# Patient Record
Sex: FEMALE | Race: WHITE | NOT HISPANIC OR LATINO | Employment: FULL TIME | URBAN - NONMETROPOLITAN AREA
[De-identification: names, ages, dates, MRNs, and addresses within clinical notes are randomized per-mention and may not be internally consistent; named-entity substitution may affect disease eponyms.]

---

## 2023-02-23 ENCOUNTER — TRANSCRIBE ORDERS (OUTPATIENT)
Dept: ADMINISTRATIVE | Facility: HOSPITAL | Age: 53
End: 2023-02-23
Payer: COMMERCIAL

## 2023-02-23 DIAGNOSIS — M25.562 LEFT KNEE PAIN, UNSPECIFIED CHRONICITY: Primary | ICD-10-CM

## 2023-02-23 DIAGNOSIS — Z85.05 PERSONAL HISTORY OF MALIGNANT NEOPLASM OF LIVER: Primary | ICD-10-CM

## 2023-03-17 ENCOUNTER — HOSPITAL ENCOUNTER (OUTPATIENT)
Dept: MRI IMAGING | Facility: HOSPITAL | Age: 53
Discharge: HOME | End: 2023-03-17
Admitting: PHYSICIAN ASSISTANT
Payer: COMMERCIAL

## 2023-03-17 DIAGNOSIS — M25.562 LEFT KNEE PAIN, UNSPECIFIED CHRONICITY: ICD-10-CM

## 2023-03-17 PROCEDURE — 73721 MRI JNT OF LWR EXTRE W/O DYE: CPT

## 2023-03-17 PROCEDURE — 73721 MRI JNT OF LWR EXTRE W/O DYE: CPT | Performed by: RADIOLOGY

## 2023-03-21 ENCOUNTER — HOSPITAL ENCOUNTER (OUTPATIENT)
Dept: CT IMAGING | Facility: HOSPITAL | Age: 53
Discharge: HOME | End: 2023-03-21
Admitting: PHYSICIAN ASSISTANT
Payer: COMMERCIAL

## 2023-03-21 DIAGNOSIS — Z85.05 PERSONAL HISTORY OF MALIGNANT NEOPLASM OF LIVER: ICD-10-CM

## 2023-03-21 LAB — CREAT BLDA-MCNC: 0.8 MG/DL (ref 0.6–1.3)

## 2023-03-21 PROCEDURE — 25510000001 IOPAMIDOL 61 % SOLUTION: Performed by: PHYSICIAN ASSISTANT

## 2023-03-21 PROCEDURE — 74177 CT ABD & PELVIS W/CONTRAST: CPT | Performed by: RADIOLOGY

## 2023-03-21 PROCEDURE — 82565 ASSAY OF CREATININE: CPT

## 2023-03-21 PROCEDURE — 74177 CT ABD & PELVIS W/CONTRAST: CPT

## 2023-03-21 RX ADMIN — IOPAMIDOL 88 ML: 612 INJECTION, SOLUTION INTRAVENOUS at 08:16

## 2023-12-27 ENCOUNTER — TRANSCRIBE ORDERS (OUTPATIENT)
Dept: ADMINISTRATIVE | Facility: HOSPITAL | Age: 53
End: 2023-12-27
Payer: COMMERCIAL

## 2023-12-27 DIAGNOSIS — R19.09 OTHER INTRA-ABDOMINAL AND PELVIC SWELLING, MASS AND LUMP: Primary | ICD-10-CM

## 2024-01-08 ENCOUNTER — HOSPITAL ENCOUNTER (OUTPATIENT)
Dept: CT IMAGING | Facility: HOSPITAL | Age: 54
Discharge: HOME | End: 2024-01-08
Admitting: SURGERY
Payer: COMMERCIAL

## 2024-01-08 DIAGNOSIS — R19.09 OTHER INTRA-ABDOMINAL AND PELVIC SWELLING, MASS AND LUMP: ICD-10-CM

## 2024-01-08 LAB — CREAT BLDA-MCNC: 1.1 MG/DL (ref 0.6–1.3)

## 2024-01-08 PROCEDURE — 74177 CT ABD & PELVIS W/CONTRAST: CPT

## 2024-01-08 PROCEDURE — 25510000001 IOPAMIDOL 61 % SOLUTION: Performed by: SURGERY

## 2024-01-08 PROCEDURE — 82565 ASSAY OF CREATININE: CPT

## 2024-01-08 PROCEDURE — 74177 CT ABD & PELVIS W/CONTRAST: CPT | Performed by: RADIOLOGY

## 2024-01-08 RX ADMIN — IOPAMIDOL 90 ML: 612 INJECTION, SOLUTION INTRAVENOUS at 09:42

## 2024-09-18 ENCOUNTER — OFFICE VISIT (OUTPATIENT)
Dept: ENDOCRINOLOGY | Facility: CLINIC | Age: 54
End: 2024-09-18
Payer: COMMERCIAL

## 2024-09-18 VITALS
OXYGEN SATURATION: 99 % | WEIGHT: 198.2 LBS | HEART RATE: 69 BPM | DIASTOLIC BLOOD PRESSURE: 89 MMHG | SYSTOLIC BLOOD PRESSURE: 137 MMHG

## 2024-09-18 DIAGNOSIS — E06.3 HASHIMOTO'S DISEASE: Primary | ICD-10-CM

## 2024-09-18 DIAGNOSIS — E27.8 ADRENAL MASS: ICD-10-CM

## 2024-09-18 PROCEDURE — 84443 ASSAY THYROID STIM HORMONE: CPT | Performed by: NURSE PRACTITIONER

## 2024-09-18 PROCEDURE — 84439 ASSAY OF FREE THYROXINE: CPT | Performed by: NURSE PRACTITIONER

## 2024-09-18 PROCEDURE — 82533 TOTAL CORTISOL: CPT | Performed by: NURSE PRACTITIONER

## 2024-09-18 PROCEDURE — 99203 OFFICE O/P NEW LOW 30 MIN: CPT | Performed by: NURSE PRACTITIONER

## 2024-09-18 PROCEDURE — 84481 FREE ASSAY (FT-3): CPT | Performed by: NURSE PRACTITIONER

## 2024-09-18 RX ORDER — KRILL/OM-3/DHA/EPA/PHOSPHO/AST 500MG-86MG
500 CAPSULE ORAL DAILY
COMMUNITY

## 2024-09-18 RX ORDER — AMPICILLIN TRIHYDRATE 250 MG
600 CAPSULE ORAL 2 TIMES DAILY
COMMUNITY

## 2024-09-18 RX ORDER — CHOLECALCIFEROL (VITAMIN D3) 25 MCG
1000 TABLET ORAL DAILY
COMMUNITY

## 2024-09-18 RX ORDER — SEMAGLUTIDE 2.68 MG/ML
2 INJECTION, SOLUTION SUBCUTANEOUS WEEKLY
COMMUNITY

## 2024-09-18 RX ORDER — THYROID 90 MG/1
180 TABLET ORAL DAILY
COMMUNITY
End: 2024-09-19 | Stop reason: SDUPTHER

## 2024-09-18 RX ORDER — IBUPROFEN 800 MG/1
800 TABLET, FILM COATED ORAL EVERY 6 HOURS PRN
COMMUNITY
Start: 2024-04-01

## 2024-09-19 PROBLEM — E27.8 ADRENAL MASS: Status: ACTIVE | Noted: 2024-09-19

## 2024-09-19 PROBLEM — E06.3 HASHIMOTO'S DISEASE: Status: ACTIVE | Noted: 2024-09-19

## 2024-09-19 LAB
CORTIS SERPL-MCNC: 5.35 MCG/DL
T3FREE SERPL-MCNC: 5.77 PG/ML (ref 2–4.4)
T4 FREE SERPL-MCNC: 0.76 NG/DL (ref 0.92–1.68)
TSH SERPL DL<=0.05 MIU/L-ACNC: 13.8 UIU/ML (ref 0.27–4.2)

## 2024-09-19 RX ORDER — LIOTHYRONINE SODIUM 5 UG/1
5 TABLET ORAL DAILY
Qty: 30 TABLET | Refills: 11 | Status: SHIPPED | OUTPATIENT
Start: 2024-09-19 | End: 2025-09-19

## 2024-09-19 RX ORDER — THYROID 120 MG/1
240 TABLET ORAL DAILY
Qty: 180 TABLET | Refills: 1 | Status: SHIPPED | OUTPATIENT
Start: 2024-09-19

## 2024-10-22 ENCOUNTER — HOSPITAL ENCOUNTER (OUTPATIENT)
Dept: ULTRASOUND IMAGING | Facility: HOSPITAL | Age: 54
Discharge: HOME | End: 2024-10-22
Admitting: NURSE PRACTITIONER
Payer: COMMERCIAL

## 2024-10-22 ENCOUNTER — TRANSCRIBE ORDERS (OUTPATIENT)
Dept: ADMINISTRATIVE | Facility: HOSPITAL | Age: 54
End: 2024-10-22
Payer: COMMERCIAL

## 2024-10-22 DIAGNOSIS — R91.8 LUNG NODULES: Primary | ICD-10-CM

## 2024-10-22 DIAGNOSIS — Z12.31 VISIT FOR SCREENING MAMMOGRAM: ICD-10-CM

## 2024-10-22 DIAGNOSIS — Z85.05 PERSONAL HISTORY OF MALIGNANT NEOPLASM OF LIVER: ICD-10-CM

## 2024-10-22 PROCEDURE — 76536 US EXAM OF HEAD AND NECK: CPT | Performed by: RADIOLOGY

## 2024-10-22 PROCEDURE — 76536 US EXAM OF HEAD AND NECK: CPT

## 2024-10-25 ENCOUNTER — HOSPITAL ENCOUNTER (OUTPATIENT)
Dept: CT IMAGING | Facility: HOSPITAL | Age: 54
Discharge: HOME | End: 2024-10-25
Payer: COMMERCIAL

## 2024-10-25 DIAGNOSIS — R91.8 LUNG NODULES: ICD-10-CM

## 2024-10-25 DIAGNOSIS — Z85.05 PERSONAL HISTORY OF MALIGNANT NEOPLASM OF LIVER: ICD-10-CM

## 2024-10-25 PROCEDURE — 25510000001 IOPAMIDOL 61 % SOLUTION: Performed by: PHYSICIAN ASSISTANT

## 2024-10-25 PROCEDURE — 74178 CT ABD&PLV WO CNTR FLWD CNTR: CPT

## 2024-10-25 PROCEDURE — 71250 CT THORAX DX C-: CPT

## 2024-10-25 RX ORDER — IOPAMIDOL 612 MG/ML
100 INJECTION, SOLUTION INTRAVASCULAR
Status: COMPLETED | OUTPATIENT
Start: 2024-10-25 | End: 2024-10-25

## 2024-10-25 RX ADMIN — IOPAMIDOL 85 ML: 612 INJECTION, SOLUTION INTRAVENOUS at 07:43

## 2025-01-14 ENCOUNTER — HOSPITAL ENCOUNTER (OUTPATIENT)
Facility: HOSPITAL | Age: 55
Discharge: HOME | End: 2025-01-14
Admitting: NURSE PRACTITIONER
Payer: COMMERCIAL

## 2025-01-14 ENCOUNTER — TRANSCRIBE ORDERS (OUTPATIENT)
Dept: ADMINISTRATIVE | Facility: HOSPITAL | Age: 55
End: 2025-01-14
Payer: COMMERCIAL

## 2025-01-14 DIAGNOSIS — R07.81 PLEURITIC CHEST PAIN: Primary | ICD-10-CM

## 2025-01-14 DIAGNOSIS — R07.81 PLEURITIC CHEST PAIN: ICD-10-CM

## 2025-01-14 PROCEDURE — 71046 X-RAY EXAM CHEST 2 VIEWS: CPT | Performed by: RADIOLOGY

## 2025-01-14 PROCEDURE — 71046 X-RAY EXAM CHEST 2 VIEWS: CPT

## 2025-05-16 ENCOUNTER — HOSPITAL ENCOUNTER (OUTPATIENT)
Facility: HOSPITAL | Age: 55
Discharge: HOME | End: 2025-05-16
Admitting: PHYSICIAN ASSISTANT
Payer: COMMERCIAL

## 2025-05-16 DIAGNOSIS — Z12.31 VISIT FOR SCREENING MAMMOGRAM: ICD-10-CM

## 2025-05-16 PROCEDURE — 77063 BREAST TOMOSYNTHESIS BI: CPT

## 2025-05-16 PROCEDURE — 77067 SCR MAMMO BI INCL CAD: CPT

## 2025-06-13 ENCOUNTER — HOSPITAL ENCOUNTER (EMERGENCY)
Facility: HOSPITAL | Age: 55
Discharge: HOME | End: 2025-06-14
Payer: COMMERCIAL

## 2025-06-13 ENCOUNTER — APPOINTMENT (OUTPATIENT)
Dept: GENERAL RADIOLOGY | Facility: HOSPITAL | Age: 55
End: 2025-06-13
Payer: COMMERCIAL

## 2025-06-13 VITALS
TEMPERATURE: 98.1 F | RESPIRATION RATE: 20 BRPM | DIASTOLIC BLOOD PRESSURE: 90 MMHG | BODY MASS INDEX: 30.86 KG/M2 | OXYGEN SATURATION: 100 % | HEIGHT: 66 IN | WEIGHT: 192 LBS | HEART RATE: 69 BPM | SYSTOLIC BLOOD PRESSURE: 166 MMHG

## 2025-06-13 DIAGNOSIS — S52.602A CLOSED FRACTURE OF DISTAL END OF LEFT ULNA, UNSPECIFIED FRACTURE MORPHOLOGY, INITIAL ENCOUNTER: ICD-10-CM

## 2025-06-13 DIAGNOSIS — S52.122A CLOSED DISPLACED FRACTURE OF HEAD OF LEFT RADIUS, INITIAL ENCOUNTER: Primary | ICD-10-CM

## 2025-06-13 PROCEDURE — 73110 X-RAY EXAM OF WRIST: CPT

## 2025-06-13 PROCEDURE — 96374 THER/PROPH/DIAG INJ IV PUSH: CPT

## 2025-06-13 PROCEDURE — 25010000002 HYDROMORPHONE PER 4 MG

## 2025-06-13 PROCEDURE — 96375 TX/PRO/DX INJ NEW DRUG ADDON: CPT

## 2025-06-13 PROCEDURE — 99285 EMERGENCY DEPT VISIT HI MDM: CPT

## 2025-06-13 PROCEDURE — 25810000003 SODIUM CHLORIDE 0.9 % SOLUTION

## 2025-06-13 PROCEDURE — 25010000002 ONDANSETRON PER 1 MG

## 2025-06-13 PROCEDURE — 73030 X-RAY EXAM OF SHOULDER: CPT

## 2025-06-13 RX ORDER — ONDANSETRON 2 MG/ML
4 INJECTION INTRAMUSCULAR; INTRAVENOUS ONCE
Status: COMPLETED | OUTPATIENT
Start: 2025-06-13 | End: 2025-06-13

## 2025-06-13 RX ORDER — SODIUM CHLORIDE 0.9 % (FLUSH) 0.9 %
10 SYRINGE (ML) INJECTION AS NEEDED
Status: DISCONTINUED | OUTPATIENT
Start: 2025-06-13 | End: 2025-06-14 | Stop reason: HOSPADM

## 2025-06-13 RX ORDER — HYDROMORPHONE HYDROCHLORIDE 1 MG/ML
0.5 INJECTION, SOLUTION INTRAMUSCULAR; INTRAVENOUS; SUBCUTANEOUS ONCE
Refills: 0 | Status: COMPLETED | OUTPATIENT
Start: 2025-06-13 | End: 2025-06-13

## 2025-06-13 RX ORDER — HYDROMORPHONE HYDROCHLORIDE 1 MG/ML
0.5 INJECTION, SOLUTION INTRAMUSCULAR; INTRAVENOUS; SUBCUTANEOUS ONCE
Refills: 0 | Status: COMPLETED | OUTPATIENT
Start: 2025-06-14 | End: 2025-06-14

## 2025-06-13 RX ADMIN — HYDROMORPHONE HYDROCHLORIDE 0.5 MG: 1 INJECTION, SOLUTION INTRAMUSCULAR; INTRAVENOUS; SUBCUTANEOUS at 21:43

## 2025-06-13 RX ADMIN — Medication 44 MG: at 22:00

## 2025-06-13 RX ADMIN — ONDANSETRON 4 MG: 2 INJECTION INTRAMUSCULAR; INTRAVENOUS at 21:42

## 2025-06-13 RX ADMIN — SODIUM CHLORIDE 1000 ML: 9 INJECTION, SOLUTION INTRAVENOUS at 21:52

## 2025-06-14 PROCEDURE — 73110 X-RAY EXAM OF WRIST: CPT | Performed by: RADIOLOGY

## 2025-06-14 PROCEDURE — 73030 X-RAY EXAM OF SHOULDER: CPT | Performed by: RADIOLOGY

## 2025-06-14 PROCEDURE — 96376 TX/PRO/DX INJ SAME DRUG ADON: CPT

## 2025-06-14 PROCEDURE — 25010000002 HYDROMORPHONE PER 4 MG

## 2025-06-14 RX ORDER — OXYCODONE AND ACETAMINOPHEN 5; 325 MG/1; MG/1
1 TABLET ORAL EVERY 6 HOURS PRN
Refills: 0 | Status: DISCONTINUED | OUTPATIENT
Start: 2025-06-14 | End: 2025-06-14 | Stop reason: HOSPADM

## 2025-06-14 RX ORDER — OXYCODONE AND ACETAMINOPHEN 7.5; 325 MG/1; MG/1
1 TABLET ORAL EVERY 6 HOURS PRN
Qty: 12 TABLET | Refills: 0 | Status: SHIPPED | OUTPATIENT
Start: 2025-06-14 | End: 2025-06-20

## 2025-06-14 RX ADMIN — HYDROMORPHONE HYDROCHLORIDE 0.5 MG: 1 INJECTION, SOLUTION INTRAMUSCULAR; INTRAVENOUS; SUBCUTANEOUS at 00:07

## 2025-06-14 NOTE — ED PROVIDER NOTES
Subjective   History of Present Illness  Patient is a 54-year-old female who presents today after a fall at her home.  She reports that after it began raining she went outside where she slipped in the mud and caught herself with her left arm.  She denies hitting her head or loss of consciousness.  She reports that the main area of her pain is to her left wrist however the pain goes all the way up to her left shoulder.  Pulses are noted distally however radial pulse is noted to be slightly decreased.  She is able to move all of her fingers without difficulty capillary refill is brisk.  There is swelling, bruising, and pain on palpation during assessment.  She denies any other complaints this date.  She presents private vehicle with her wife at bedside.        Review of Systems   HENT: Negative.     Respiratory: Negative.     Cardiovascular: Negative.    Gastrointestinal: Negative.    Musculoskeletal:         L arm pain   Skin:         bruising   Neurological: Negative.        No past medical history on file.    Allergies   Allergen Reactions    Soybean-Containing Drug Products Seizure    Morphine Other (See Comments)     SWEATING, PAIN, NAUSEA AND DIZZY       Past Surgical History:   Procedure Laterality Date    HYSTERECTOMY  2004       Family History   Problem Relation Age of Onset    Breast cancer Neg Hx        Social History     Socioeconomic History    Marital status:    Tobacco Use    Smoking status: Never    Smokeless tobacco: Never   Substance and Sexual Activity    Alcohol use: Not Currently     Comment: 4x yearly    Drug use: Never    Sexual activity: Defer           Objective   Physical Exam  Constitutional:       Appearance: Normal appearance.   HENT:      Head: Normocephalic and atraumatic.      Nose: Nose normal.   Cardiovascular:      Rate and Rhythm: Normal rate.      Heart sounds: Normal heart sounds.   Pulmonary:      Effort: Pulmonary effort is normal.      Breath sounds: Normal breath  sounds.   Abdominal:      General: Bowel sounds are normal.   Musculoskeletal:         General: Swelling, tenderness, deformity and signs of injury present.      Cervical back: Normal range of motion.   Skin:     Capillary Refill: Capillary refill takes 2 to 3 seconds.      Findings: Bruising present.   Neurological:      Mental Status: She is alert and oriented to person, place, and time.         FX Dislocation    Date/Time: 6/13/2025 10:00 PM    Performed by: Gopal Buckley DO  Authorized by: Gopal Buckley DO    Consent:     Consent obtained:  Written and verbal    Consent given by:  Patient    Risks, benefits, and alternatives were discussed: yes      Risks discussed:  Nerve damage and pain    Alternatives discussed:  No treatment, delayed treatment and observation  Universal protocol:     Procedure explained and questions answered to patient or proxy's satisfaction: yes      Relevant documents present and verified: yes      Imaging studies available: yes      Site/side marked: yes      Immediately prior to procedure, a time out was called: yes      Patient identity confirmed:  Verbally with patient and arm band  Injury:     Injury location:  Forearm    Forearm injury location:  L forearm    Forearm fracture type: radial head    Pre-procedure details:     Distal neurologic exam:  Normal    Distal perfusion: distal pulses diminished      Range of motion: reduced    Sedation:     Sedation type:  Moderate sedation  Anesthesia:     Anesthesia method:  None  Procedure details:     Manipulation performed: yes      Reduction successful: yes      X-ray confirmed reduction: yes      Immobilization:  Splint and sling    Splint type:  Sugar tong    Supplies used:  Cotton padding, sling and fiberglass    Attestation: Splint applied and adjusted personally by me    Post-procedure details:     Distal neurologic exam:  Normal    Distal perfusion: distal pulses strong and brisk capillary refill      Range of motion:  improved      Procedure completion:  Tolerated  Procedural Sedation    Date/Time: 6/13/2025 10:00 PM    Performed by: Gopal Buckley DO  Authorized by: Gopal Buckley DO    Consent:     Consent obtained:  Verbal    Consent given by:  Patient    Risks, benefits, and alternatives were discussed: yes      Risks discussed:  Allergic reaction, prolonged hypoxia resulting in organ damage, dysrhythmia, prolonged sedation necessitating reversal and inadequate sedation    Alternatives discussed:  Analgesia without sedation  Universal protocol:     Procedure explained and questions answered to patient or proxy's satisfaction: yes      Relevant documents present and verified: yes      Test results available: yes      Imaging studies available: yes      Site/side marked: yes      Immediately prior to procedure, a time out was called: yes      Patient identity confirmed:  Verbally with patient and arm band  Indications:     Procedure performed:  Fracture reduction    Procedure necessitating sedation performed by:  Physician performing sedation    Intended level of sedation:  Moderate  Pre-sedation assessment:     Time since last food or drink:  1800    ASA classification: class 1 - normal, healthy patient      Mouth opening:  3 or more finger widths    Thyromental distance:  4 finger widths    Mallampati score:  I - soft palate, uvula, fauces, pillars visible    Neck mobility: normal      Pre-sedation assessments completed and reviewed: airway patency, anesthesia/sedation history, cardiovascular function, hydration status, mental status, nausea/vomiting and pain level      History of difficult intubation: no      Pre-sedation assessment completed:  6/13/2025 9:57 PM  Immediate pre-procedure details:     Reassessment: Patient reassessed immediately prior to procedure      Reviewed: vital signs and relevant labs/tests      Verified: bag valve mask available, emergency equipment available, intubation equipment available,  IV patency confirmed, oxygen available and suction available    Procedure details (see MAR for exact dosages):     Sedation start time:  6/13/2025 10:00 PM    Preoxygenation:  Nasal cannula    Sedation:  Ketamine    Analgesia:  Hydromorphone    Intra-procedure monitoring:  Blood pressure monitoring, frequent LOC assessments, cardiac monitor, continuous pulse oximetry and frequent vital sign checks    Intra-procedure events: none      Sedation end time:  6/13/2025 10:06 PM    Total sedation time (minutes):  9  Post-procedure details:     Post-sedation assessment completed:  6/13/2025 10:09 PM    Attendance: Constant attendance by certified staff until patient recovered      Recovery: Patient returned to pre-procedure baseline      Post-sedation assessments completed and reviewed: airway patency, cardiovascular function, mental status, nausea/vomiting, pain level and respiratory function      Specimens recovered:  None    Patient is stable for discharge or admission: yes      Procedure completion:  Tolerated             ED Course  ED Course as of 06/14/25 0552   Fri Jun 13, 2025   2224 Imaging reviewed per Dr. Buckley, reports the reduction is successful. Patient reports she does feel some better. Splint in place post reduction, capillary refill brisk, distal pulses strong [KH]      ED Course User Index  [KH] Micki Gabriel, APRN                                                       Medical Decision Making  Patient is a 54-year-old female who presents today after a fall at her home.  She reports that after it began raining she went outside where she slipped in the mud and caught herself with her left arm.  She denies hitting her head or loss of consciousness.  She reports that the main area of her pain is to her left wrist however the pain goes all the way up to her left shoulder.  Pulses are noted distally however radial pulse is noted to be slightly decreased.  She is able to move all of her fingers without  difficulty capillary refill is brisk.  There is swelling, bruising, and pain on palpation during assessment.  She denies any other complaints this date.  She presents private vehicle with her wife at bedside.    Problems Addressed:  Closed displaced fracture of head of left radius, initial encounter: complicated acute illness or injury  Closed fracture of distal end of left ulna, unspecified fracture morphology, initial encounter: complicated acute illness or injury    Amount and/or Complexity of Data Reviewed  Radiology: ordered.    Risk  Prescription drug management.        Final diagnoses:   Closed displaced fracture of head of left radius, initial encounter   Closed fracture of distal end of left ulna, unspecified fracture morphology, initial encounter       ED Disposition  ED Disposition       ED Disposition   Discharge    Condition   Stable    Comment   --               Beau Oliveira  [address] [place] [address] 25   [address]  [place] [id number] [phone]    Schedule an appointment as soon as possible for a visit   As needed    Luan Gusman, DO  [address] [id number] [phone]    Schedule an appointment as soon as possible for a visit on 6/16/2025           Medication List        New Prescriptions      oxyCODONE-acetaminophen 7.5-325 MG per tablet  Commonly known as: PERCOCET  Take 1 tablet by mouth Every 6 (Six) Hours As Needed for Moderate Pain.               Where to Get Your Medications        These medications were sent to [facility] [address] [place] [address] - [address] [place] [address] - [phone]  - [phone]   [address] [address]      Phone: [phone]   oxyCODONE-acetaminophen 7.5-325 MG per tablet            Micki Gabriel, APRN  06/14/25 0552

## 2025-06-20 ENCOUNTER — PRE-ADMISSION TESTING (OUTPATIENT)
Dept: PREADMISSION TESTING | Facility: HOSPITAL | Age: 55
End: 2025-06-20
Payer: COMMERCIAL

## 2025-06-20 LAB
ANION GAP SERPL CALCULATED.3IONS-SCNC: 12 MMOL/L (ref 5–15)
BUN SERPL-MCNC: 11.3 MG/DL (ref 6–20)
BUN/CREAT SERPL: 15.3 (ref 7–25)
CALCIUM SPEC-SCNC: 9.6 MG/DL (ref 8.6–10.5)
CHLORIDE SERPL-SCNC: 101 MMOL/L (ref 98–107)
CO2 SERPL-SCNC: 29 MMOL/L (ref 22–29)
CREAT SERPL-MCNC: 0.74 MG/DL (ref 0.57–1)
DEPRECATED RDW RBC AUTO: 42.7 FL (ref 37–54)
EGFRCR SERPLBLD CKD-EPI 2021: 95.1 ML/MIN/1.73
ERYTHROCYTE [DISTWIDTH] IN BLOOD BY AUTOMATED COUNT: 14.1 % (ref 12.3–15.4)
GLUCOSE SERPL-MCNC: 89 MG/DL (ref 65–99)
HCT VFR BLD AUTO: 41.4 % (ref 34–46.6)
HGB BLD-MCNC: 13.3 G/DL (ref 12–15.9)
MCH RBC QN AUTO: 27 PG (ref 26.6–33)
MCHC RBC AUTO-ENTMCNC: 32.1 G/DL (ref 31.5–35.7)
MCV RBC AUTO: 84 FL (ref 79–97)
PLATELET # BLD AUTO: 185 10*3/MM3 (ref 140–450)
PMV BLD AUTO: 11.3 FL (ref 6–12)
POTASSIUM SERPL-SCNC: 3.8 MMOL/L (ref 3.5–5.2)
RBC # BLD AUTO: 4.93 10*6/MM3 (ref 3.77–5.28)
SODIUM SERPL-SCNC: 142 MMOL/L (ref 136–145)
WBC NRBC COR # BLD AUTO: 4.69 10*3/MM3 (ref 3.4–10.8)

## 2025-06-20 PROCEDURE — 36415 COLL VENOUS BLD VENIPUNCTURE: CPT

## 2025-06-20 PROCEDURE — 80048 BASIC METABOLIC PNL TOTAL CA: CPT

## 2025-06-20 PROCEDURE — 85027 COMPLETE CBC AUTOMATED: CPT

## 2025-06-20 RX ORDER — TRAMADOL HYDROCHLORIDE 50 MG/1
TABLET ORAL
COMMUNITY
Start: 2025-06-18

## 2025-06-20 RX ORDER — GABAPENTIN 100 MG/1
CAPSULE ORAL
COMMUNITY
Start: 2025-06-18

## 2025-06-20 RX ORDER — MELOXICAM 15 MG/1
TABLET ORAL
COMMUNITY
Start: 2025-03-31

## 2025-06-20 NOTE — DISCHARGE INSTRUCTIONS
Please discontinue all blood thinners and anticoagulants (except aspirin) prior to surgery as per your surgeon and cardiologist instructions.  Aspirin may be continued up to the day prior to surgery.    HOLD all diabetic medications the morning of surgery as order by physician.    Please follow instructions on use of prep cloths provided by nurse. Return instruction sheet to pre-op nurse on day of surgery.    Arrival time for surgery on  6/23/25 will be given to you by Dr. Perera's  Office.    A RESPONSIBLE PERSON MUST REMAIN IN THE WAITING ROOM DURING YOUR PROCEDURE AND A RESPONSIBLE  MUST BE AVAILABLE UPON YOUR DISCHARGE.    General Instructions:  Do NOT eat or drink after midnight 6/22/25 which includes water, mints, or gum.  You may brush your teeth. Dental appliances that are removable must be taken out day of surgery.  Do NOT smoke, chew tobacco, or drink alcohol within 24 hours prior to surgery.  Bring medications in original bottles, any inhalers and if applicable your C-PAP/BI-PAP machine  Bring any papers given to you in the doctor’s office  Wear clean, comfortable clothes and socks  Do NOT wear contact lenses or make-up or dark nail polish.  Bring a case for your glasses if applicable.  Bring crutches or walker if applicable  Leave all other valuables and jewelry at home  If you were given a blood bank armband, continue to wear it until discharged.    Preventing a Surgical Site Infection:  Shower the night before surgery (unless instructed otherwise) using a fresh bar of anti-bacterial soap (such as Dial) and clean washcloth.  Dry with a clean towel and dress in clean clothing.  For 2 to 3 days before surgery, avoid shaving with a razor near where you will have surgery because the razor can irritate skin and make it easier to develop an infection.  Ask your surgeon if you will be receiving antibiotics prior to surgery.  Make sure you, your family, and all healthcare providers clean their hands with  soap and water or an alcohol-based hand  before caring for you or your wound.  If at all possible, quit smoking as many days before surgery as you can.    Day of Surgery:  Upon arrival, a pre-op nurse and anesthesiologist will review your health history, obtain vital signs, and answer questions you may have.  The only belongings needed at this time will be your home medications and if applicable you C-PAP/BI-PAP machine.  If you are staying overnight, your family can leave the rest of your belongings in the car and bring them to your room later.  A pre-op nurse will start an IV and you may receive medication in preparation for surgery.  Due to patient privacy and limited space, only one member of your family will be able to accompany you in the pre-op area.  While you are in surgery your family should notify the waiting room  if they leave the waiting room area and provide a contact number.  Please be aware that surgery does come with discomfort.  We want to make every effort to control your discomfort so please discuss any uncontrolled symptoms with your nurse.  Your doctor will most likely have prescribed pain medications.  If you are going home after surgery you will receive individualized written care instructions before being discharged.  A responsible adult must drive you to and from the hospital on the day of surgery and stay with you for 24 hours.  If you are staying overnight following surgery, you will be transported to your hospital room following the recovery period.

## 2025-06-23 ENCOUNTER — ANESTHESIA EVENT (OUTPATIENT)
Dept: PERIOP | Facility: HOSPITAL | Age: 55
End: 2025-06-23
Payer: COMMERCIAL

## 2025-06-23 ENCOUNTER — HOSPITAL ENCOUNTER (OUTPATIENT)
Facility: HOSPITAL | Age: 55
Setting detail: HOSPITAL OUTPATIENT SURGERY
Discharge: HOME | End: 2025-06-23
Attending: GENERAL PRACTICE | Admitting: GENERAL PRACTICE
Payer: COMMERCIAL

## 2025-06-23 ENCOUNTER — ANESTHESIA (OUTPATIENT)
Dept: PERIOP | Facility: HOSPITAL | Age: 55
End: 2025-06-23
Payer: COMMERCIAL

## 2025-06-23 ENCOUNTER — APPOINTMENT (OUTPATIENT)
Dept: GENERAL RADIOLOGY | Facility: HOSPITAL | Age: 55
End: 2025-06-23
Payer: COMMERCIAL

## 2025-06-23 VITALS
HEART RATE: 58 BPM | SYSTOLIC BLOOD PRESSURE: 142 MMHG | OXYGEN SATURATION: 99 % | BODY MASS INDEX: 37.97 KG/M2 | DIASTOLIC BLOOD PRESSURE: 78 MMHG | RESPIRATION RATE: 18 BRPM | HEIGHT: 60 IN | TEMPERATURE: 97.4 F | WEIGHT: 193.4 LBS

## 2025-06-23 DIAGNOSIS — Z98.890 H/O WRIST SURGERY: Primary | ICD-10-CM

## 2025-06-23 LAB — GLUCOSE BLDC GLUCOMTR-MCNC: 80 MG/DL (ref 70–130)

## 2025-06-23 PROCEDURE — 25010000002 FENTANYL CITRATE (PF) 50 MCG/ML SOLUTION: Performed by: NURSE ANESTHETIST, CERTIFIED REGISTERED

## 2025-06-23 PROCEDURE — 76000 FLUOROSCOPY <1 HR PHYS/QHP: CPT

## 2025-06-23 PROCEDURE — 25010000002 LIDOCAINE PF 2% 2 % SOLUTION: Performed by: NURSE ANESTHETIST, CERTIFIED REGISTERED

## 2025-06-23 PROCEDURE — C1713 ANCHOR/SCREW BN/BN,TIS/BN: HCPCS | Performed by: GENERAL PRACTICE

## 2025-06-23 PROCEDURE — 25010000002 KETOROLAC TROMETHAMINE PER 15 MG: Performed by: NURSE ANESTHETIST, CERTIFIED REGISTERED

## 2025-06-23 PROCEDURE — 25010000002 MIDAZOLAM PER 1 MG: Performed by: ANESTHESIOLOGY

## 2025-06-23 PROCEDURE — 25010000002 FAMOTIDINE (PF) 20 MG/2ML SOLUTION: Performed by: NURSE ANESTHETIST, CERTIFIED REGISTERED

## 2025-06-23 PROCEDURE — 25010000002 ROPIVACAINE PER 1 MG: Performed by: ANESTHESIOLOGY

## 2025-06-23 PROCEDURE — 25810000003 LACTATED RINGERS PER 1000 ML: Performed by: ANESTHESIOLOGY

## 2025-06-23 PROCEDURE — 25010000002 ONDANSETRON PER 1 MG: Performed by: NURSE ANESTHETIST, CERTIFIED REGISTERED

## 2025-06-23 PROCEDURE — 25010000002 DEXAMETHASONE PER 1 MG: Performed by: NURSE ANESTHETIST, CERTIFIED REGISTERED

## 2025-06-23 PROCEDURE — 25010000002 PROPOFOL 200 MG/20ML EMULSION: Performed by: NURSE ANESTHETIST, CERTIFIED REGISTERED

## 2025-06-23 PROCEDURE — 82948 REAGENT STRIP/BLOOD GLUCOSE: CPT

## 2025-06-23 PROCEDURE — 25010000002 FENTANYL CITRATE (PF) 50 MCG/ML SOLUTION: Performed by: ANESTHESIOLOGY

## 2025-06-23 PROCEDURE — 25010000002 CEFAZOLIN PER 500 MG: Performed by: GENERAL PRACTICE

## 2025-06-23 DEVICE — SCRW LK VA W STRDRV 2.4X16MM: Type: IMPLANTABLE DEVICE | Site: WRIST | Status: FUNCTIONAL

## 2025-06-23 DEVICE — BONE WAX
Type: IMPLANTABLE DEVICE | Site: WRIST | Status: FUNCTIONAL
Brand: ETHICON

## 2025-06-23 DEVICE — PLT LCP VA 2CLMN NRW6H 2HL SHFT SS2.4LT: Type: IMPLANTABLE DEVICE | Site: WRIST | Status: FUNCTIONAL

## 2025-06-23 DEVICE — SCRW CORT S/TAP STRDRV 2.7X16MM: Type: IMPLANTABLE DEVICE | Site: WRIST | Status: FUNCTIONAL

## 2025-06-23 DEVICE — SCRW LK VA W STRDRV 2.4X20MM: Type: IMPLANTABLE DEVICE | Site: WRIST | Status: FUNCTIONAL

## 2025-06-23 RX ORDER — ONDANSETRON 2 MG/ML
INJECTION INTRAMUSCULAR; INTRAVENOUS AS NEEDED
Status: DISCONTINUED | OUTPATIENT
Start: 2025-06-23 | End: 2025-06-23 | Stop reason: SURG

## 2025-06-23 RX ORDER — ONDANSETRON 2 MG/ML
4 INJECTION INTRAMUSCULAR; INTRAVENOUS AS NEEDED
Status: DISCONTINUED | OUTPATIENT
Start: 2025-06-23 | End: 2025-06-23 | Stop reason: HOSPADM

## 2025-06-23 RX ORDER — FAMOTIDINE 10 MG/ML
INJECTION, SOLUTION INTRAVENOUS AS NEEDED
Status: DISCONTINUED | OUTPATIENT
Start: 2025-06-23 | End: 2025-06-23 | Stop reason: SURG

## 2025-06-23 RX ORDER — ONDANSETRON 4 MG/1
4 TABLET, FILM COATED ORAL EVERY 8 HOURS PRN
Qty: 20 TABLET | Refills: 0 | Status: SHIPPED | OUTPATIENT
Start: 2025-06-23

## 2025-06-23 RX ORDER — FENTANYL CITRATE 50 UG/ML
50 INJECTION, SOLUTION INTRAMUSCULAR; INTRAVENOUS
Status: DISCONTINUED | OUTPATIENT
Start: 2025-06-23 | End: 2025-06-23 | Stop reason: HOSPADM

## 2025-06-23 RX ORDER — PROPOFOL 10 MG/ML
INJECTION, EMULSION INTRAVENOUS AS NEEDED
Status: DISCONTINUED | OUTPATIENT
Start: 2025-06-23 | End: 2025-06-23 | Stop reason: SURG

## 2025-06-23 RX ORDER — OXYCODONE HYDROCHLORIDE 5 MG/1
5 TABLET ORAL EVERY 6 HOURS PRN
Qty: 30 TABLET | Refills: 0 | Status: SHIPPED | OUTPATIENT
Start: 2025-06-23

## 2025-06-23 RX ORDER — LIDOCAINE HYDROCHLORIDE 20 MG/ML
INJECTION, SOLUTION EPIDURAL; INFILTRATION; INTRACAUDAL; PERINEURAL AS NEEDED
Status: DISCONTINUED | OUTPATIENT
Start: 2025-06-23 | End: 2025-06-23 | Stop reason: SURG

## 2025-06-23 RX ORDER — SODIUM CHLORIDE 0.9 % (FLUSH) 0.9 %
10 SYRINGE (ML) INJECTION EVERY 12 HOURS SCHEDULED
Status: DISCONTINUED | OUTPATIENT
Start: 2025-06-23 | End: 2025-06-23 | Stop reason: HOSPADM

## 2025-06-23 RX ORDER — MAGNESIUM HYDROXIDE 1200 MG/15ML
LIQUID ORAL AS NEEDED
Status: DISCONTINUED | OUTPATIENT
Start: 2025-06-23 | End: 2025-06-23 | Stop reason: HOSPADM

## 2025-06-23 RX ORDER — IPRATROPIUM BROMIDE AND ALBUTEROL SULFATE 2.5; .5 MG/3ML; MG/3ML
3 SOLUTION RESPIRATORY (INHALATION) ONCE AS NEEDED
Status: DISCONTINUED | OUTPATIENT
Start: 2025-06-23 | End: 2025-06-23 | Stop reason: HOSPADM

## 2025-06-23 RX ORDER — SODIUM CHLORIDE 0.9 % (FLUSH) 0.9 %
10 SYRINGE (ML) INJECTION AS NEEDED
Status: DISCONTINUED | OUTPATIENT
Start: 2025-06-23 | End: 2025-06-23 | Stop reason: HOSPADM

## 2025-06-23 RX ORDER — KETOROLAC TROMETHAMINE 30 MG/ML
INJECTION, SOLUTION INTRAMUSCULAR; INTRAVENOUS AS NEEDED
Status: DISCONTINUED | OUTPATIENT
Start: 2025-06-23 | End: 2025-06-23 | Stop reason: SURG

## 2025-06-23 RX ORDER — MIDAZOLAM HYDROCHLORIDE 1 MG/ML
1 INJECTION, SOLUTION INTRAMUSCULAR; INTRAVENOUS
Status: DISCONTINUED | OUTPATIENT
Start: 2025-06-23 | End: 2025-06-23 | Stop reason: HOSPADM

## 2025-06-23 RX ORDER — CELECOXIB 200 MG/1
200 CAPSULE ORAL DAILY
Qty: 14 CAPSULE | Refills: 0 | Status: SHIPPED | OUTPATIENT
Start: 2025-06-23

## 2025-06-23 RX ORDER — MIDAZOLAM HYDROCHLORIDE 1 MG/ML
INJECTION, SOLUTION INTRAMUSCULAR; INTRAVENOUS AS NEEDED
Status: DISCONTINUED | OUTPATIENT
Start: 2025-06-23 | End: 2025-06-23 | Stop reason: SURG

## 2025-06-23 RX ORDER — FENTANYL CITRATE 50 UG/ML
INJECTION, SOLUTION INTRAMUSCULAR; INTRAVENOUS AS NEEDED
Status: DISCONTINUED | OUTPATIENT
Start: 2025-06-23 | End: 2025-06-23 | Stop reason: SURG

## 2025-06-23 RX ORDER — ROPIVACAINE HYDROCHLORIDE 5 MG/ML
INJECTION, SOLUTION EPIDURAL; INFILTRATION; PERINEURAL
Status: COMPLETED | OUTPATIENT
Start: 2025-06-23 | End: 2025-06-23

## 2025-06-23 RX ORDER — DEXAMETHASONE SODIUM PHOSPHATE 4 MG/ML
INJECTION, SOLUTION INTRA-ARTICULAR; INTRALESIONAL; INTRAMUSCULAR; INTRAVENOUS; SOFT TISSUE AS NEEDED
Status: DISCONTINUED | OUTPATIENT
Start: 2025-06-23 | End: 2025-06-23 | Stop reason: SURG

## 2025-06-23 RX ORDER — MEPERIDINE HYDROCHLORIDE 25 MG/ML
12.5 INJECTION INTRAMUSCULAR; INTRAVENOUS; SUBCUTANEOUS
Status: DISCONTINUED | OUTPATIENT
Start: 2025-06-23 | End: 2025-06-23 | Stop reason: HOSPADM

## 2025-06-23 RX ORDER — SODIUM CHLORIDE, SODIUM LACTATE, POTASSIUM CHLORIDE, CALCIUM CHLORIDE 600; 310; 30; 20 MG/100ML; MG/100ML; MG/100ML; MG/100ML
125 INJECTION, SOLUTION INTRAVENOUS ONCE
Status: COMPLETED | OUTPATIENT
Start: 2025-06-23 | End: 2025-06-23

## 2025-06-23 RX ORDER — SODIUM CHLORIDE 9 MG/ML
40 INJECTION, SOLUTION INTRAVENOUS AS NEEDED
Status: DISCONTINUED | OUTPATIENT
Start: 2025-06-23 | End: 2025-06-23 | Stop reason: HOSPADM

## 2025-06-23 RX ADMIN — SODIUM CHLORIDE, POTASSIUM CHLORIDE, SODIUM LACTATE AND CALCIUM CHLORIDE: 600; 310; 30; 20 INJECTION, SOLUTION INTRAVENOUS at 07:55

## 2025-06-23 RX ADMIN — FENTANYL CITRATE 50 MCG: 50 INJECTION, SOLUTION INTRAMUSCULAR; INTRAVENOUS at 07:33

## 2025-06-23 RX ADMIN — FENTANYL CITRATE 50 MCG: 50 INJECTION, SOLUTION INTRAMUSCULAR; INTRAVENOUS at 09:14

## 2025-06-23 RX ADMIN — FAMOTIDINE 20 MG: 10 INJECTION, SOLUTION INTRAVENOUS at 08:05

## 2025-06-23 RX ADMIN — MIDAZOLAM HYDROCHLORIDE 2 MG: 1 INJECTION, SOLUTION INTRAMUSCULAR; INTRAVENOUS at 07:32

## 2025-06-23 RX ADMIN — ROPIVACAINE HYDROCHLORIDE 150 MG: 5 INJECTION, SOLUTION EPIDURAL; INFILTRATION; PERINEURAL at 07:36

## 2025-06-23 RX ADMIN — DEXAMETHASONE SODIUM PHOSPHATE 4 MG: 4 INJECTION, SOLUTION INTRA-ARTICULAR; INTRALESIONAL; INTRAMUSCULAR; INTRAVENOUS; SOFT TISSUE at 08:05

## 2025-06-23 RX ADMIN — CEFAZOLIN 2000 MG: 2 INJECTION, POWDER, FOR SOLUTION INTRAMUSCULAR; INTRAVENOUS at 07:55

## 2025-06-23 RX ADMIN — ONDANSETRON 4 MG: 2 INJECTION INTRAMUSCULAR; INTRAVENOUS at 08:05

## 2025-06-23 RX ADMIN — PROPOFOL 150 MG: 10 INJECTION, EMULSION INTRAVENOUS at 08:05

## 2025-06-23 RX ADMIN — PROPOFOL 50 MG: 10 INJECTION, EMULSION INTRAVENOUS at 08:41

## 2025-06-23 RX ADMIN — LIDOCAINE HYDROCHLORIDE 100 MG: 20 INJECTION, SOLUTION EPIDURAL; INFILTRATION; INTRACAUDAL; PERINEURAL at 08:01

## 2025-06-23 RX ADMIN — KETOROLAC TROMETHAMINE 30 MG: 30 INJECTION, SOLUTION INTRAMUSCULAR; INTRAVENOUS at 08:05

## 2025-06-23 NOTE — ANESTHESIA POSTPROCEDURE EVALUATION
Patient: Jackie L Wegener    Procedure Summary       Date: 06/23/25 Room / Location: [facility] OR 01 /  [facility] OR    Anesthesia Start: 0755 Anesthesia Stop: 0853    Procedure: LEFT DISTAL RADIUS REPAIR (Left: Wrist) Diagnosis:       Closed fracture of left distal radius      ([S52.502A])    Surgeons: Dany Perera MD Provider: Brandon Orellana MD    Anesthesia Type: general ASA Status: 3            Anesthesia Type: general    Vitals  Vitals Value Taken Time   /80 06/23/25 09:30   Temp 97 °F (36.1 °C) 06/23/25 08:55   Pulse 59 06/23/25 09:30   Resp 15 06/23/25 09:30   SpO2 99 % 06/23/25 09:30           Post Anesthesia Care and Evaluation    Patient location during evaluation: PACU  Patient participation: complete - patient participated  Level of consciousness: awake  Pain score: 0  Pain management: satisfactory to patient    Airway patency: patent  Anesthetic complications: No anesthetic complications  PONV Status: none  Cardiovascular status: hemodynamically stable  Respiratory status: nasal cannula  Hydration status: acceptable

## 2025-06-23 NOTE — ANESTHESIA PROCEDURE NOTES
Airway  Reason: elective    Date/Time: 6/23/2025 8:01 AM  Airway not difficult    General Information and Staff    Patient location during procedure: OR  CRNA/CAA: Liya Thomas CRNA    Indications and Patient Condition    Preoxygenated: yes  MILS not maintained throughout    Mask difficulty assessment: 0 - not attempted    Final Airway Details    Final airway type: supraglottic airway      Successful airway: unique  Size: 4   Number of attempts at approach: 1  Assessment: lips, teeth, and gum same as pre-op and atraumatic intubation    Additional Comments  Atraumatic LMA placement, dentition unchanged.

## 2025-06-23 NOTE — OP NOTE
Lanie DAVIS Wegener  6/23/2025      Operative Note:    Surgeon: Dany Perera MD    Assistant: MARCE Clancy    Preoperative Diagnosis:   Left distal radius fracture, intra-articular, comminuted, four-part  Left ulnar styloid fracture    Postoperative Diagnosis:   Same    Procedure(s):    1.  Left distal radius fracture repair, CPT code 02548  2.  Intraoperative use of fluoroscopy, CPT code 64231    Anesthesia: General With regional block    Estimated Blood Loss: 0 cc    Specimen Obtained:  None    Complication(s):  None apparent.     Implants: Synthes distal radius particular locking plate    Operative Indication:     The patient is a 54-year-old who fell and sustained the above injury.  Due to the acute displaced nature of her injury she elected proceed with the above operation.  Risk benefits alternatives and complications was discussed with the patient prior to surgery.    Operative Details:    The patient was met in the preoperative suite where the correct operative site was marked. The patient was brought to the operating room where anesthesia was initiated. The patient was positioned appropriately with all bony prominences well-padded. The patient was prepped and draped in the usual sterile fashion and prior to incision a timeout was observed to verify the correct operative site, procedure and antibiotics.    A longitudinal incision over the volar aspect of the wrist was taken down to the flexor tendons.  This was incised in line with the incision mobilizing the tendons ulnarly.  The pronator quadratus was then released exposing the fracture site.  This was debrided.  Of reduction was then able to be achieved.  A volar plate was placed.  Cortical screws were placed proximally and locking screws were done distally with the aid of intraoperative fluoroscopy.  Final exporter x-rays show satisfactory reduction and placement of the hardware.  The ulnar styloid did show appropriate alignment as well.    Lateral  projection of the wrist was then achieved in anterior-posterior projections of the distal ulna was then done without any subluxation of the DRUJ.  This was deemed a negative stress radiograph.  Therefore no DRUJ pinning was then performed.  The wound was irrigated thoroughly with sterile saline.  The pronator quadratus was repaired.  The fascia was repaired.  The remainder of the wound was closed in standard layered fashion.  A soft dressing was applied.  Patient was placed in a short arm volar arm splint.  Patient was extubated, transferred hospital bed in the PACU stable condition.  Patient tolerated procedure well without any complications.     Postoperative Plan:    Discharged home today  Dressing-maintain dressing until follow-up  Weight Bear/Lifting Status-no lifting  DVT PPx-aspirin 81 mg twice daily for 14 days  Follow up-2 weeks in the office    Electronically Signed by: Dany Perera MD

## 2025-06-23 NOTE — ANESTHESIA PROCEDURE NOTES
Peripheral Block      Patient reassessed immediately prior to procedure    Patient location during procedure: pre-op  Start time: 6/23/2025 7:28 AM  Stop time: 6/23/2025 7:48 AM  Reason for block: at surgeon's request and post-op pain management  Performed by  Anesthesiologist: Brandon Orellana MD  Preanesthetic Checklist  Completed: patient identified, IV checked, site marked, risks and benefits discussed, surgical consent, monitors and equipment checked, pre-op evaluation and timeout performed  Prep:  Pt Position: sitting  Sterile barriers:alcohol skin prep, cap, gloves, mask and washed/disinfected hands  Prep: ChloraPrep  Patient monitoring: blood pressure monitoring, continuous pulse oximetry and EKG  Procedure    Sedation: no  Performed under: MAC  Guidance:ultrasound guided    ULTRASOUND INTERPRETATION.  Using ultrasound guidance a 20 G gauge needle was placed in close proximity to the nerve, at which point, under ultrasound guidance anesthetic was injected in the area of the nerve and spread of the anesthesia was seen on ultrasound in close proximity thereto.  There were no abnormalities seen on ultrasound; a digital image was taken; and the patient tolerated the procedure with no complications. Images:still images obtained, printed/placed on chart    Laterality:left  Block Type:axillary  Injection Technique:single-shot  Needle Type:short-bevel  Needle Gauge:20 G  Resistance on Injection: less than 15 psi    Medications Used: ropivacaine (NAROPIN) injection 0.5 % - Peripheral Nerve   150 mg - 6/23/2025 7:36:00 AM      Post Assessment  Injection Assessment: no paresthesia on injection, negative aspiration for heme and incremental injection  Patient Tolerance:comfortable throughout block  Complications:no  Performed by: Brnadon Orellana MD

## 2025-06-23 NOTE — ANESTHESIA PREPROCEDURE EVALUATION
Anesthesia Evaluation     Patient summary reviewed and Nursing notes reviewed   no history of anesthetic complications:   NPO Solid Status: > 8 hours  NPO Liquid Status: > 8 hours           Airway   Mallampati: II  TM distance: >3 FB  Neck ROM: full  Dental - normal exam     Pulmonary - negative pulmonary ROS    breath sounds clear to auscultation  Cardiovascular   Exercise tolerance: good (4-7 METS)    Rate: normal    (+) hyperlipidemia      Neuro/Psych- negative ROS  GI/Hepatic/Renal/Endo    (+) obesity, diabetes mellitus, thyroid problem hypothyroidism    Musculoskeletal (-) negative ROS    Abdominal   (+) obese    Abdomen: soft.   Substance History - negative use     OB/GYN negative ob/gyn ROS         Other      history of cancer                  Anesthesia Plan    ASA 3     general     intravenous induction     Anesthetic plan, risks, benefits, and alternatives have been provided, discussed and informed consent has been obtained with: patient.  Pre-procedure education provided  Use of blood products discussed with  Consented to blood products.    Plan discussed with CRNA.    CODE STATUS:

## (undated) DEVICE — BNDG,ELSTC,MATRIX,STRL,3"X5YD,LF,HOOK&LP: Brand: MEDLINE

## (undated) DEVICE — BNDG,ELSTC,MATRIX,STRL,4"X5YD,LF,HOOK&LP: Brand: MEDLINE

## (undated) DEVICE — ANTIBACTERIAL UNDYED BRAIDED (POLYGLACTIN 910), SYNTHETIC ABSORBABLE SUTURE: Brand: COATED VICRYL

## (undated) DEVICE — DRSNG PAD ABD 8X10IN STRL

## (undated) DEVICE — DRP C/ARM W/BAND W/CLIPS 41X74IN

## (undated) DEVICE — DRSNG WND GZ CURAD OIL EMULSION 3X3IN STRL

## (undated) DEVICE — STRIP,CLOSURE,WOUND,MEDI-STRIP,1/2X4: Brand: MEDLINE

## (undated) DEVICE — PENCL ES MEGADINE EZ/CLEAN BUTN W/HOLSTR 10FT

## (undated) DEVICE — UNDERCAST PADDING: Brand: DEROYAL

## (undated) DEVICE — DISPOSABLE TOURNIQUET CUFF SINGLE BLADDER, SINGLE PORT AND LUER LOCK CONNECTOR: Brand: COLOR CUFF

## (undated) DEVICE — HOLDER: Brand: DEROYAL

## (undated) DEVICE — BNDG ELAS CO-FLEX SLF ADHR 4IN5YD LF STRL

## (undated) DEVICE — BIT DRL QC DIA W/DEPTHMARK 1.8X110MM

## (undated) DEVICE — TP SXN YANKR FLANG STRL

## (undated) DEVICE — ARM SLING: Brand: DEROYAL

## (undated) DEVICE — SUT ETHLN 3/0 FS1 663G

## (undated) DEVICE — PK EXTREM UPPR 70

## (undated) DEVICE — Device

## (undated) DEVICE — SUT MNCRYL PLS ANTIB UD 2/0 CP1 27IN